# Patient Record
Sex: FEMALE | Race: WHITE | Employment: OTHER | URBAN - METROPOLITAN AREA
[De-identification: names, ages, dates, MRNs, and addresses within clinical notes are randomized per-mention and may not be internally consistent; named-entity substitution may affect disease eponyms.]

---

## 2022-12-08 ENCOUNTER — ANESTHESIA (OUTPATIENT)
Dept: SURGERY | Age: 67
End: 2022-12-08
Payer: MEDICARE

## 2022-12-08 ENCOUNTER — ANESTHESIA EVENT (OUTPATIENT)
Dept: SURGERY | Age: 67
End: 2022-12-08
Payer: MEDICARE

## 2022-12-08 ENCOUNTER — HOSPITAL ENCOUNTER (OUTPATIENT)
Age: 67
Setting detail: OUTPATIENT SURGERY
Discharge: HOME | End: 2022-12-08
Attending: OPHTHALMOLOGY | Admitting: OPHTHALMOLOGY
Payer: MEDICARE

## 2022-12-08 VITALS
TEMPERATURE: 97.2 F | DIASTOLIC BLOOD PRESSURE: 87 MMHG | HEART RATE: 77 BPM | RESPIRATION RATE: 17 BRPM | OXYGEN SATURATION: 96 % | WEIGHT: 134 LBS | BODY MASS INDEX: 22.88 KG/M2 | SYSTOLIC BLOOD PRESSURE: 162 MMHG | HEIGHT: 64 IN

## 2022-12-08 PROCEDURE — 3600000014 HC SURGERY LEVEL 4 ADDTL 15MIN: Performed by: OPHTHALMOLOGY

## 2022-12-08 PROCEDURE — 2580000003 HC RX 258: Performed by: NURSE ANESTHETIST, CERTIFIED REGISTERED

## 2022-12-08 PROCEDURE — 3700000001 HC ADD 15 MINUTES (ANESTHESIA): Performed by: OPHTHALMOLOGY

## 2022-12-08 PROCEDURE — 2500000003 HC RX 250 WO HCPCS: Performed by: OPHTHALMOLOGY

## 2022-12-08 PROCEDURE — 7100000010 HC PHASE II RECOVERY - FIRST 15 MIN: Performed by: OPHTHALMOLOGY

## 2022-12-08 PROCEDURE — 6370000000 HC RX 637 (ALT 250 FOR IP): Performed by: OPHTHALMOLOGY

## 2022-12-08 PROCEDURE — 2709999900 HC NON-CHARGEABLE SUPPLY: Performed by: OPHTHALMOLOGY

## 2022-12-08 PROCEDURE — 3700000000 HC ANESTHESIA ATTENDED CARE: Performed by: OPHTHALMOLOGY

## 2022-12-08 PROCEDURE — 6360000002 HC RX W HCPCS: Performed by: NURSE ANESTHETIST, CERTIFIED REGISTERED

## 2022-12-08 PROCEDURE — 3600000004 HC SURGERY LEVEL 4 BASE: Performed by: OPHTHALMOLOGY

## 2022-12-08 RX ORDER — PREDNISOLONE ACETATE 10 MG/ML
SUSPENSION/ DROPS OPHTHALMIC
Status: COMPLETED | OUTPATIENT
Start: 2022-12-08 | End: 2022-12-08

## 2022-12-08 RX ORDER — BALANCED SALT SOLUTION 6.4; .75; .48; .3; 3.9; 1.7 MG/ML; MG/ML; MG/ML; MG/ML; MG/ML; MG/ML
SOLUTION OPHTHALMIC
Status: COMPLETED | OUTPATIENT
Start: 2022-12-08 | End: 2022-12-08

## 2022-12-08 RX ORDER — MIDAZOLAM HYDROCHLORIDE 1 MG/ML
INJECTION INTRAMUSCULAR; INTRAVENOUS PRN
Status: DISCONTINUED | OUTPATIENT
Start: 2022-12-08 | End: 2022-12-08 | Stop reason: SDUPTHER

## 2022-12-08 RX ORDER — SODIUM CHLORIDE 0.9 % (FLUSH) 0.9 %
5-40 SYRINGE (ML) INJECTION EVERY 12 HOURS SCHEDULED
Status: CANCELLED | OUTPATIENT
Start: 2022-12-08

## 2022-12-08 RX ORDER — OFLOXACIN 3 MG/ML
SOLUTION/ DROPS OPHTHALMIC
Status: COMPLETED | OUTPATIENT
Start: 2022-12-08 | End: 2022-12-08

## 2022-12-08 RX ORDER — ONDANSETRON 2 MG/ML
4 INJECTION INTRAMUSCULAR; INTRAVENOUS
Status: CANCELLED | OUTPATIENT
Start: 2022-12-08 | End: 2022-12-09

## 2022-12-08 RX ORDER — LATANOPROST 50 UG/ML
1 SOLUTION/ DROPS OPHTHALMIC NIGHTLY
COMMUNITY

## 2022-12-08 RX ORDER — MIDAZOLAM HYDROCHLORIDE 1 MG/ML
INJECTION INTRAMUSCULAR; INTRAVENOUS PRN
Status: DISCONTINUED | OUTPATIENT
Start: 2022-12-08 | End: 2022-12-08

## 2022-12-08 RX ORDER — BRIMONIDINE TARTRATE 2 MG/ML
SOLUTION/ DROPS OPHTHALMIC
Status: COMPLETED | OUTPATIENT
Start: 2022-12-08 | End: 2022-12-08

## 2022-12-08 RX ORDER — BALANCED SALT SOLUTION ENRICHED WITH BICARBONATE, DEXTROSE, AND GLUTATHIONE
KIT INTRAOCULAR
Status: COMPLETED | OUTPATIENT
Start: 2022-12-08 | End: 2022-12-08

## 2022-12-08 RX ORDER — FENTANYL CITRATE 50 UG/ML
INJECTION, SOLUTION INTRAMUSCULAR; INTRAVENOUS PRN
Status: DISCONTINUED | OUTPATIENT
Start: 2022-12-08 | End: 2022-12-08 | Stop reason: SDUPTHER

## 2022-12-08 RX ORDER — SODIUM CHLORIDE 9 MG/ML
INJECTION, SOLUTION INTRAVENOUS CONTINUOUS PRN
Status: DISCONTINUED | OUTPATIENT
Start: 2022-12-08 | End: 2022-12-08 | Stop reason: SDUPTHER

## 2022-12-08 RX ORDER — LIDOCAINE HYDROCHLORIDE 10 MG/ML
INJECTION, SOLUTION EPIDURAL; INFILTRATION; INTRACAUDAL; PERINEURAL
Status: COMPLETED | OUTPATIENT
Start: 2022-12-08 | End: 2022-12-08

## 2022-12-08 RX ORDER — DIPHENHYDRAMINE HYDROCHLORIDE 50 MG/ML
12.5 INJECTION INTRAMUSCULAR; INTRAVENOUS
Status: CANCELLED | OUTPATIENT
Start: 2022-12-08 | End: 2022-12-09

## 2022-12-08 RX ORDER — SODIUM CHLORIDE 9 MG/ML
INJECTION, SOLUTION INTRAVENOUS PRN
Status: CANCELLED | OUTPATIENT
Start: 2022-12-08

## 2022-12-08 RX ORDER — SODIUM CHLORIDE 0.9 % (FLUSH) 0.9 %
5-40 SYRINGE (ML) INJECTION PRN
Status: CANCELLED | OUTPATIENT
Start: 2022-12-08

## 2022-12-08 RX ADMIN — FENTANYL CITRATE 100 MCG: 50 INJECTION INTRAMUSCULAR; INTRAVENOUS at 14:15

## 2022-12-08 RX ADMIN — MIDAZOLAM 2 MG: 1 INJECTION INTRAMUSCULAR; INTRAVENOUS at 14:15

## 2022-12-08 RX ADMIN — SODIUM CHLORIDE: 9 INJECTION, SOLUTION INTRAVENOUS at 14:14

## 2022-12-08 ASSESSMENT — ENCOUNTER SYMPTOMS: SHORTNESS OF BREATH: 0

## 2022-12-08 ASSESSMENT — LIFESTYLE VARIABLES: SMOKING_STATUS: 1

## 2022-12-08 ASSESSMENT — PAIN - FUNCTIONAL ASSESSMENT: PAIN_FUNCTIONAL_ASSESSMENT: 0-10

## 2022-12-08 ASSESSMENT — PAIN SCALES - GENERAL
PAINLEVEL_OUTOF10: 0
PAINLEVEL_OUTOF10: 0

## 2022-12-08 NOTE — INTERVAL H&P NOTE
Update History & Physical    The patient's History and Physical of December 8, 2022 was reviewed with the patient and I examined the patient. There was no change. The surgical site was confirmed by the patient and me. Plan: The risks, benefits, expected outcome, and alternative to the recommended procedure have been discussed with the patient. Patient understands and wants to proceed with the procedure.      Electronically signed by Hailey Carlos MD on 12/8/2022 at 2:28 PM

## 2022-12-08 NOTE — INTERVAL H&P NOTE
Update History & Physical    The patient's History and Physical of December 8, 2022 was reviewed with the patient and I examined the patient. There was no change. The surgical site was confirmed by the patient and me. Plan: The risks, benefits, expected outcome, and alternative to the recommended procedure have been discussed with the patient. Patient understands and wants to proceed with the procedure.      Electronically signed by Henrietta Meckel, MD on 12/8/2022 at 2:15 PM

## 2022-12-08 NOTE — OP NOTE
Operative Note        [place] [address] [address] [facility] [address] [place] [address]  [phone]      12/8/2022    Patient name: Martine Escalona  YOB: 1955  MRN: [id number]    [place]iges: No Known Allergies    Pre-operative diagnosis:  Retained lens fragment left eye    Post-operative diagnosis:  Same    Procedure Performed:  Lens fragment removal left eye     Anesthesia:  Topical Anesthesia with MAC. Attending Surgeon: Peterson East MD     Assistant Surgeon: Sravani Bolaños MD    Estimated Blood Loss: None    Specimens removed: None    Complications:  None    Description of Procedure:  After the risks and benefits of, and alternatives to, the planned procedure were explained to the patient, informed consent was obtained. The patient was ushered into the operating room and placed in the supine position on the table. The operative eye and the surrounding area were prepped and draped in the usual sterile fashion. Under the operating microscope, a prior main incision was opened with a Kuglen. A small amount of preservative-free 1% lidocaine was instilled into the anterior chamber. Provisc was instilled into the anterior chamber and used to bring the lens fragment near the main incision. Irrigation and aspiration were used to remove the lens fragment. Irrigation and aspiration was used to remove the residual Provisc. The wounds were hydrated and examined. The wound was self-sealing. The drapes were removed, and Pred Forte and Vigamox drops were instilled in the inferior fornix. A shield was taped over the eye. The patient was taken to the recovery area in stable condition.         Electronically signed by: Haritha An MD,12/8/2022,2:30 PM

## 2022-12-08 NOTE — ANESTHESIA PRE PROCEDURE
Department of Anesthesiology  Preprocedure Note       Name:  Shannan Cordova   Age:  74 y.o.  :  1955                                          MRN:  [id number]         Date:  2022      Surgeon: Marilin Bonilla):  Alicia See MD    Procedure: Procedure(s):  REMOVAL OF LENS FRAGMENT - LEFT    Medications prior to admission:   Prior to Admission medications    Medication Sig Start Date End Date Taking? Authorizing Provider   latanoprost (XALATAN) 0.005 % ophthalmic solution 1 drop nightly   Yes Historical Provider, MD   oxycodone-acetaminophen (PERCOCET) 5-325 MG per tablet Take 1 tablet by mouth every 6 hours as needed for Pain. Patient not taking: Reported on 2022 10/28/11   Michoacano Dickson MD   sucralfate (CARAFATE) 1 GM tablet Take 1 g by mouth 4 times daily. Patient not taking: Reported on 2022    Historical Provider, MD   dexlansoprazole (DEXILANT) 60 MG CPDR delayed release capsule Take 60 mg by mouth daily. Patient not taking: Reported on 2022    Historical Provider, MD       Current medications:    No current facility-administered medications for this encounter.        Allergies:  No Known Allergies    Problem List:    Patient Active Problem List   Diagnosis Code    Chest pain R07.9    Hiatal hernia K44.9    Lung nodule R91.1    Gastritis K29.70       Past Medical History:        Diagnosis Date    Hyperlipidemia     Leukocytosis     Ulcer        Past Surgical History:        Procedure Laterality Date    BREAST BIOPSY      DILATION AND CURETTAGE OF UTERUS      ENDOSCOPY, COLON, DIAGNOSTIC      HYSTERECTOMY (CERVIX STATUS UNKNOWN)      partial    TUBAL LIGATION         Social History:    Social History     Tobacco Use    Smoking status: Every Day     Packs/day: 1.50     Years: 25.00     Pack years: 37.50     Types: Cigarettes    Smokeless tobacco: Never   Substance Use Topics    Alcohol use: Yes     Comment: occassionally - twice a week Ready to quit: Not Answered  Counseling given: Not Answered      Vital Signs (Current):   Vitals:    12/08/22 1250   BP: (!) 180/90   Pulse: 76   Resp: 22   Temp: 97.2 °F (36.2 °C)   TempSrc: Temporal   SpO2: 100%   Weight: 134 lb (60.8 kg)   Height: 5' 4\" (1.626 m)                                              BP Readings from Last 3 Encounters:   12/08/22 (!) 180/90   10/08/13 125/74   11/08/11 116/78       NPO Status: Time of last liquid consumption: 0900                        Time of last solid consumption: 1830                        Date of last liquid consumption: 12/08/22                        Date of last solid food consumption: 12/07/22    BMI:   Wt Readings from Last 3 Encounters:   12/08/22 134 lb (60.8 kg)   10/08/13 124 lb (56.2 kg)   11/08/11 130 lb (59 kg)     Body mass index is 23 kg/m². CBC:   Lab Results   Component Value Date/Time    WBC 11.6 10/24/2011 03:14 PM    RBC 4.18 10/24/2011 03:14 PM    HGB 13.1 10/24/2011 03:14 PM    HCT 38.3 10/24/2011 03:14 PM    MCV 91.5 10/24/2011 03:14 PM    RDW 13.4 10/24/2011 03:14 PM     10/24/2011 03:14 PM       CMP:   Lab Results   Component Value Date/Time     10/08/2013 02:28 PM    K 4.2 10/08/2013 02:28 PM     10/08/2013 02:28 PM    CO2 33 10/08/2013 02:28 PM    BUN 13 10/08/2013 02:28 PM    CREATININE 0.8 10/08/2013 02:28 PM    GFRAA >60 10/08/2013 02:28 PM    GFRAA >60 10/24/2011 03:14 PM    AGRATIO 1.7 10/08/2013 02:28 PM    LABGLOM >60 10/08/2013 02:28 PM    GLUCOSE 90 10/08/2013 02:28 PM    PROT 7.3 10/08/2013 02:28 PM    PROT 7.0 10/24/2011 03:14 PM    CALCIUM 9.5 10/08/2013 02:28 PM    BILITOT 0.30 10/08/2013 02:28 PM    ALKPHOS 99 10/08/2013 02:28 PM    AST 19 10/08/2013 02:28 PM    ALT 15 10/08/2013 02:28 PM       POC Tests: No results for input(s): POCGLU, POCNA, POCK, POCCL, POCBUN, POCHEMO, POCHCT in the last 72 hours.     Coags:   Lab Results   Component Value Date/Time    PROTIME 10.2 10/24/2011 03:14 PM    INR 0.94

## 2022-12-08 NOTE — ANESTHESIA POSTPROCEDURE EVALUATION
Department of Anesthesiology  Postprocedure Note    Patient: Briseida Connolly  MRN: [id number]  YOB: 1955  Date of evaluation: 12/8/2022      Procedure Summary     Date: 12/08/22 Room / Location: [address]    Anesthesia Start: 1414 Anesthesia Stop: 1427    Procedure: REMOVAL OF LENS FRAGMENT - LEFT (Left: Eye) Diagnosis:       Cataract (lens) fragments in eye following cataract surgery, left eye      (Cataract (lens) fragments in eye following cataract surgery, left eye)    Surgeons: Pramod Medina MD Responsible Provider: Comfort Marino MD    Anesthesia Type: MAC ASA Status: 3          Anesthesia Type: No value filed.     Miguel Angel Phase I: Miguel Angel Score: 9    Miguel Angel Phase II: Miguel Angel Score: 10      Anesthesia Post Evaluation    Patient location during evaluation: PACU  Patient participation: complete - patient participated  Level of consciousness: awake and alert  Pain score: 0  Nausea & Vomiting: no nausea  Complications: no  Cardiovascular status: hemodynamically stable  Respiratory status: acceptable  Hydration status: stable

## 2022-12-08 NOTE — DISCHARGE INSTRUCTIONS
[facility] [address]   [phone]       Post-Operative Instructions for Day of Cataract Surgery with Dr. Harpreet Reyna    Patient Name: Sigrid Councilman  : 1955  MRN: [id number]      Bring your eye drops with you to each appointment! 1. A responsible adult, 18 years or older must be in attendance for 24 hours following discharge. 2. Rest quietly today. 3. Start with liquids first.  If no nausea, proceed with a light meal.  Drink at least 6 glasses of fluid after surgery. 4. Do not drive or operate heavy machinery for 24 hours or as directed. 5. No alcoholic beverages for 24 hours post op. 6. Do not make any legal or important decisions for the next 24 hours. 7. Check your temperature over the next five days and call your physician if greater than 101.0 F.    8. Notify your physician if you have rash, hives, difficulty breathing, or severe nausea or vomiting. 5. Contact your family physician for questions concerning your current home medications. 10. If pain intensifies or pain is unrelieved with pain medication as ordered, call your physician    ADDITIONAL INSTRUCTIONS    The post op drops are VERY IMPORTANT. Use them as directed on your drop schedule. Always bring your post-op bag, drops and instruction sheet to all of your visits. Tape your protective shield over your eye at bedtime or naptime for one week to prevent accidentally rubbing or bumping your eye. If you have a patch or a shield on the eye, it is to remain on until you see your doctor on the day following your surgery. Keep the area around your eye clean with a clean face cloth moistened with warm water. Keep soap, lotion, shampoo, hairspray make-up, etc. away from your eye for 7 days. Do not wash your hair for 24 hours. Do not rub your eye. This is the worse thing you can do while healing.   No heavy lifting, bending, or straining for one week after surgery. You should have no severe pain after surgery. Your eye may feel irritated or scratchy. You may take Tylenol   (acetaminophen) for discomfort as needed as long as you do not have any liver problems. If pain becomes severe, call the doctors office immediately. Your eye may be red or bloodshot. This will gradually lessen as your eye heals. CALL THE OFFICE IMMEDIATELY IF YOU EXPERIENCE ANY OF THE FOLLOWING:  Sudden decrease in vision, severe pain, shower of floaters, flashes of light, veil-like curtain across your eye    Your next appointment is 12/9 @ 8:25 AM at the [facility] location. DR. Wai Cantrell PHONE NUMBER:  [phone]  or Toll Free [phone]    THERE IS AN EYE PHYSICIAN ON CALL 24 HOURS A DAY, SEVEN DAYS A WEEK--CALL FOR ANY QUESTIONS/PROBLEMS    Medications prior to admission have been reviewed. Please continue medications as ordered on Current Medication   Record when discharged unless otherwise noted. Anticoagulation therapy medications: May restart anticoagulants on the day of surgery as directed by your primary care physician. Please continue medications only as directed by your primary care and specialist physicians. The medications on your medication reconciliation form are simply the medications you reported you were taking at the time of this admission. We are simply asking you to resume the outpatient medications that you reported to us when you presented for your procedure. Please make sure you check with the physician(s) who prescribed your medications today when you leave the hospital to be sure you are taking the correct medications and dosages.   Patient and/or responsible party verbalizes understanding of above instructions

## (undated) DEVICE — SOLUTION IV IRRIG WATER 500ML POUR BRL ST 2F7113

## (undated) DEVICE — Z DISCONTINUED USE 2131664 WIPE INSTR W3XL3IN NONLINTING

## (undated) DEVICE — Device: Brand: MEDEX

## (undated) DEVICE — SYRINGE TB 1ML NDL 25GA L0.625IN PLAS SLIP TIP CONVENTIONAL

## (undated) DEVICE — SOLUTION IRRIG BSS ST 500ML

## (undated) DEVICE — SYRINGE, LUER LOCK, 30ML: Brand: MEDLINE

## (undated) DEVICE — Device

## (undated) DEVICE — GLOVE SURG SZ 65 L12IN FNGR THK87MIL WHT LTX FREE

## (undated) DEVICE — SURGICAL PROC PACK SHT WEST V4

## (undated) DEVICE — 3 ML SYRINGE LUER-LOCK TIP: Brand: MONOJECT